# Patient Record
Sex: MALE | Race: WHITE | Employment: UNEMPLOYED | URBAN - METROPOLITAN AREA
[De-identification: names, ages, dates, MRNs, and addresses within clinical notes are randomized per-mention and may not be internally consistent; named-entity substitution may affect disease eponyms.]

---
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
## 2023-06-05 ENCOUNTER — OFFICE VISIT (OUTPATIENT)
Dept: OPTOMETRY | Facility: CLINIC | Age: 18
End: 2023-06-05
Payer: COMMERCIAL

## 2023-06-05 DIAGNOSIS — H52.203 HIGH DEGREE OF ASTIGMATISM IN BOTH EYES: Primary | ICD-10-CM

## 2023-06-05 ASSESSMENT — REFRACTION_CURRENTRX
OS_CYLINDER: -5.25
OD_SPHERE: +2.50
OD_BASECURVE: 8.7
OD_AXIS: 010
OS_BRAND: BIOFINITY TORIC XR
OD_DIAMETER: 14.5
OS_AXIS: 175
OS_DIAMETER: 14.5
OS_BASECURVE: 8.7
OD_CYLINDER: -5.25
OD_BRAND: BIOFINITY TORIC XR
OS_SPHERE: +2.50

## 2023-06-05 ASSESSMENT — CUP TO DISC RATIO
OD_RATIO: 0.30
OS_RATIO: 0.30

## 2023-06-05 ASSESSMENT — REFRACTION_MANIFEST
OS_AXIS: 089
OD_AXIS: 110
OS_SPHERE: -4.75
OD_CYLINDER: +6.25
OS_CYLINDER: +5.00
OD_SPHERE: -3.50
OD_AXIS: 103
OS_SPHERE: -3.50
METHOD_AUTOREFRACTION: 1
OD_SPHERE: -4.25
OS_AXIS: 080
OS_CYLINDER: +6.25
OD_CYLINDER: +5.50

## 2023-06-05 ASSESSMENT — EXTERNAL EXAM - RIGHT EYE: OD_EXAM: NORMAL

## 2023-06-05 ASSESSMENT — VISUAL ACUITY
METHOD_MR: OU: 20/20
OD_CC: 20/40-2
METHOD: SNELLEN - LINEAR
OS_CC: 20/25

## 2023-06-05 ASSESSMENT — REFRACTION_WEARINGRX
OD_CYLINDER: +5.50
OS_CYLINDER: +5.50
OD_AXIS: 100
OS_SPHERE: -3.00
OD_SPHERE: -3.00
OS_AXIS: 085

## 2023-06-05 ASSESSMENT — TONOMETRY
IOP_METHOD: ICARE
OS_IOP_MMHG: 17
OD_IOP_MMHG: 16

## 2023-06-05 ASSESSMENT — CONF VISUAL FIELD
OD_INFERIOR_NASAL_RESTRICTION: 0
OD_NORMAL: 1
OD_SUPERIOR_TEMPORAL_RESTRICTION: 0
OS_SUPERIOR_TEMPORAL_RESTRICTION: 0
OS_INFERIOR_NASAL_RESTRICTION: 0
OD_SUPERIOR_NASAL_RESTRICTION: 0
OD_INFERIOR_TEMPORAL_RESTRICTION: 0
OS_INFERIOR_TEMPORAL_RESTRICTION: 0
OS_SUPERIOR_NASAL_RESTRICTION: 0
OS_NORMAL: 1

## 2023-06-05 ASSESSMENT — SLIT LAMP EXAM - LIDS
COMMENTS: NORMAL
COMMENTS: NORMAL

## 2023-06-05 ASSESSMENT — EXTERNAL EXAM - LEFT EYE: OS_EXAM: NORMAL

## 2023-06-05 NOTE — PROGRESS NOTES
A/P  1.) High Astigmatism each eye  -Referred by [facility], has also seen [facility] in 2018.   -Still without evidence of keratoconus, no evidence of amblyopia. High regular WTR cyl on Pentacam, stable to previous. Pachy excellent.   -Previously trialed sclerals for high cyl, now regularly in soft torics (in college) and doing well overall  -Improved vision with updated Rx today, appreciates consistently on OR and he would like to just order new lenses right away  -BCVA 20/25+ right eye/left eye and 20/20 OU  -Undilated ocular health unremarkable OU    Low suspicion for KCN at this time. Should continue to monitor annually still. Order annual supply of CL's and mail direct      Contact Lens Billing  V-Code:   Soft Toric   Final Contact Lens Rx       Brand Base Curve Diameter Sphere Cylinder Axis    Right Biofinity Toric XR 8.7 14.5 +2.00 -5.75 015    Left Biofinity Toric XR  8.7 14.5 +2.00 -5.75 175    Expiration Date: 6/5/25    Replacement: Monthly    Solutions: Multipurpose    Wearing Schedule: Daily Wear        Fair order mailed direct: [id number]  # of units: 4 boxes  Cost per Unit: $130 per box    This patient requires contact lenses that are medically necessary for either improvement in vision over spectacles, support of the ocular surface, or other therapeutic benefit. These are not cosmetic contact lenses.     Encounter Diagnosis   Name Primary?     High degree of astigmatism in both eyes Yes

## 2024-12-16 ENCOUNTER — OFFICE VISIT (OUTPATIENT)
Dept: OPTOMETRY | Facility: CLINIC | Age: 19
End: 2024-12-16
Payer: COMMERCIAL

## 2024-12-16 DIAGNOSIS — H52.203 HIGH DEGREE OF ASTIGMATISM IN BOTH EYES: Primary | ICD-10-CM

## 2024-12-16 ASSESSMENT — REFRACTION_CURRENTRX
OD_AXIS: 015
OS_BASECURVE: 8.7
OS_SPHERE: +2.00
OS_DIAMETER: 14.5
OD_CYLINDER: -5.75
OD_BASECURVE: 8.7
OD_BRAND: BIOFINITY TORIC XR
OD_SPHERE: +2.00
OD_DIAMETER: 14.5
OS_CYLINDER: -5.75
OS_BRAND: BIOFINITY TORIC XR
OS_AXIS: 175

## 2024-12-16 ASSESSMENT — CONF VISUAL FIELD
OS_NORMAL: 1
OD_NORMAL: 1
OS_SUPERIOR_TEMPORAL_RESTRICTION: 0
OD_SUPERIOR_TEMPORAL_RESTRICTION: 0
OD_INFERIOR_TEMPORAL_RESTRICTION: 0
OS_INFERIOR_NASAL_RESTRICTION: 0
OS_SUPERIOR_NASAL_RESTRICTION: 0
OS_INFERIOR_TEMPORAL_RESTRICTION: 0
OD_INFERIOR_NASAL_RESTRICTION: 0
OD_SUPERIOR_NASAL_RESTRICTION: 0

## 2024-12-16 ASSESSMENT — REFRACTION_MANIFEST
OD_AXIS: 103
OS_AXIS: 085
OS_CYLINDER: +4.75
OD_CYLINDER: +5.75
OD_SPHERE: -4.25
OS_SPHERE: -4.25

## 2024-12-16 ASSESSMENT — REFRACTION_WEARINGRX
OD_AXIS: 110
OS_AXIS: 080
OS_CYLINDER: +5.00
OD_SPHERE: -3.50
OD_CYLINDER: +5.50
OS_SPHERE: -3.50

## 2024-12-16 ASSESSMENT — TONOMETRY
OD_IOP_MMHG: 14
IOP_METHOD: ICARE
OS_IOP_MMHG: 14

## 2024-12-16 ASSESSMENT — CUP TO DISC RATIO
OD_RATIO: 0.30
OS_RATIO: 0.30

## 2024-12-16 ASSESSMENT — EXTERNAL EXAM - RIGHT EYE: OD_EXAM: NORMAL

## 2024-12-16 ASSESSMENT — VISUAL ACUITY
OS_CC: 20/25
CORRECTION_TYPE: CONTACTS
METHOD: SNELLEN - LINEAR
OD_CC: 20/40+2

## 2024-12-16 ASSESSMENT — SLIT LAMP EXAM - LIDS
COMMENTS: NORMAL
COMMENTS: NORMAL

## 2024-12-16 ASSESSMENT — EXTERNAL EXAM - LEFT EYE: OS_EXAM: NORMAL

## 2024-12-16 NOTE — PROGRESS NOTES
A/P  1.) High Astigmatism each eye  -Referred by [facility], has also seen [facility] in 2018.   -Still without evidence of keratoconus, no evidence of amblyopia. High regular WTR cyl on Pentacam. Pachy excellent. VERY low suspicion for KCN, monitor only  -Previously trialed sclerals for high cyl, now regularly in soft torics (in college) and doing well overall  -BCVA 20/25+ right eye/left eye and 20/20 OU with small myopic shift OU  -Dilated ocular health unremarkable OU    Rx's updated. Monitor 1-2 years here, sooner prn